# Patient Record
Sex: MALE | Race: WHITE | NOT HISPANIC OR LATINO | URBAN - METROPOLITAN AREA
[De-identification: names, ages, dates, MRNs, and addresses within clinical notes are randomized per-mention and may not be internally consistent; named-entity substitution may affect disease eponyms.]

---

## 2024-04-19 ENCOUNTER — OFFICE (OUTPATIENT)
Dept: URBAN - METROPOLITAN AREA CLINIC 11 | Facility: CLINIC | Age: 44
End: 2024-04-19

## 2024-04-19 DIAGNOSIS — Z80.0 FAMILY HISTORY OF MALIGNANT NEOPLASM OF DIGESTIVE ORGANS: ICD-10-CM

## 2024-04-19 DIAGNOSIS — K21.9 GASTRO-ESOPHAGEAL REFLUX DISEASE WITHOUT ESOPHAGITIS: ICD-10-CM

## 2024-04-19 PROCEDURE — 99204 OFFICE O/P NEW MOD 45 MIN: CPT | Performed by: STUDENT IN AN ORGANIZED HEALTH CARE EDUCATION/TRAINING PROGRAM

## 2024-04-19 RX ORDER — SODIUM PICOSULFATE, MAGNESIUM OXIDE, AND ANHYDROUS CITRIC ACID 12; 3.5; 1 G/175ML; G/175ML; MG/175ML
LIQUID ORAL
Qty: 1 | Refills: 0 | Status: COMPLETED
Start: 2024-04-19 | End: 2024-06-07

## 2024-04-19 RX ORDER — FAMOTIDINE 20 MG/1
TABLET, FILM COATED ORAL
Qty: 60 | Refills: 2 | Status: ACTIVE
Start: 2024-04-19

## 2024-04-19 NOTE — SERVICENOTES
Patient history of rectal cancer in his father and so he is due for colonoscopy now.  Also recommended that he  discussed with his sister to have a colonoscopy as well.  given his reflux it is persisted for a couple of months in a white male we will schedule him for diagnostic EGD as well to rule out H pylori and peptic ulcer disease.  We will switch him to famotidine 2 weeks prior to the procedure.  Discussed with the patient the risks and benefits of the procedure and he agrees to proceed.  We will have him return to clinic as needed after his procedure.  All questions addressed.

## 2024-04-19 NOTE — SERVICEHPINOTES
Mr. Fernando Hester is a  44-year-old white male with past medical history of HIV with undetectable viral load on a RT, who presents to [place] [address] for heartburn and recently diagnosed rectal cancer in his father.  
br
br Clinic visit 04/19/2024: 
br Patient reports that his father was diagnosed with rectal cancer within the last year and has been undergoing radiation and chemotherapy.  He sees a doctor at [place] [id number].  The patient himself has never had an EGD or colonoscopy.  He notices some worsening reflux over the last 2 months.  He has been using omeprazole as needed with intermittent improvement.  He does not take any significant NSAIDs.  No dysphagia but he does have some  Regurgitation occasionally.  No unintentional weight loss.  No blood in the stool or black tarry stool he sees Dr. Alan Mohamud as his primary care doctor who prescribes his HIV medication and reports that his viral load has always been undetectable for the last 11 years of his diagnosis.  He has no previous abdominal surgeries.  He overall feels well otherwise.  his platelets are normal, hemoglobin normal, LFTs normal

## 2024-06-07 ENCOUNTER — AMBULATORY SURGICAL CENTER (OUTPATIENT)
Dept: URBAN - METROPOLITAN AREA SURGERY 3 | Facility: SURGERY | Age: 44
End: 2024-06-07
Payer: COMMERCIAL

## 2024-06-07 ENCOUNTER — AMBULATORY SURGICAL CENTER (OUTPATIENT)
Dept: URBAN - METROPOLITAN AREA SURGERY 3 | Facility: SURGERY | Age: 44
End: 2024-06-07

## 2024-06-07 ENCOUNTER — OFFICE (OUTPATIENT)
Dept: URBAN - METROPOLITAN AREA PATHOLOGY 12 | Facility: PATHOLOGY | Age: 44
End: 2024-06-07

## 2024-06-07 VITALS
DIASTOLIC BLOOD PRESSURE: 78 MMHG | SYSTOLIC BLOOD PRESSURE: 111 MMHG | DIASTOLIC BLOOD PRESSURE: 62 MMHG | OXYGEN SATURATION: 99 % | RESPIRATION RATE: 16 BRPM | HEART RATE: 61 BPM | RESPIRATION RATE: 18 BRPM | DIASTOLIC BLOOD PRESSURE: 54 MMHG | HEART RATE: 68 BPM | DIASTOLIC BLOOD PRESSURE: 54 MMHG | HEART RATE: 73 BPM | HEART RATE: 58 BPM | RESPIRATION RATE: 17 BRPM | HEART RATE: 58 BPM | TEMPERATURE: 97.9 F | HEART RATE: 72 BPM | OXYGEN SATURATION: 100 % | HEART RATE: 72 BPM | SYSTOLIC BLOOD PRESSURE: 96 MMHG | RESPIRATION RATE: 16 BRPM | SYSTOLIC BLOOD PRESSURE: 95 MMHG | DIASTOLIC BLOOD PRESSURE: 62 MMHG | SYSTOLIC BLOOD PRESSURE: 91 MMHG | DIASTOLIC BLOOD PRESSURE: 58 MMHG | WEIGHT: 182.8 LBS | SYSTOLIC BLOOD PRESSURE: 96 MMHG | HEART RATE: 63 BPM | DIASTOLIC BLOOD PRESSURE: 48 MMHG | WEIGHT: 182.8 LBS | DIASTOLIC BLOOD PRESSURE: 74 MMHG | SYSTOLIC BLOOD PRESSURE: 93 MMHG | HEIGHT: 72 IN | SYSTOLIC BLOOD PRESSURE: 91 MMHG | HEIGHT: 72 IN | TEMPERATURE: 97.9 F | TEMPERATURE: 98.5 F | OXYGEN SATURATION: 99 % | RESPIRATION RATE: 16 BRPM | TEMPERATURE: 97.9 F | HEART RATE: 56 BPM | HEART RATE: 61 BPM | RESPIRATION RATE: 18 BRPM | SYSTOLIC BLOOD PRESSURE: 111 MMHG | HEART RATE: 73 BPM | HEART RATE: 68 BPM | SYSTOLIC BLOOD PRESSURE: 91 MMHG | HEART RATE: 56 BPM | HEART RATE: 63 BPM | SYSTOLIC BLOOD PRESSURE: 93 MMHG | DIASTOLIC BLOOD PRESSURE: 71 MMHG | OXYGEN SATURATION: 100 % | DIASTOLIC BLOOD PRESSURE: 78 MMHG | TEMPERATURE: 98.5 F | DIASTOLIC BLOOD PRESSURE: 74 MMHG | DIASTOLIC BLOOD PRESSURE: 58 MMHG | SYSTOLIC BLOOD PRESSURE: 93 MMHG | WEIGHT: 182.8 LBS | SYSTOLIC BLOOD PRESSURE: 95 MMHG | RESPIRATION RATE: 18 BRPM | DIASTOLIC BLOOD PRESSURE: 48 MMHG | RESPIRATION RATE: 17 BRPM | SYSTOLIC BLOOD PRESSURE: 143 MMHG | HEIGHT: 72 IN | RESPIRATION RATE: 17 BRPM | DIASTOLIC BLOOD PRESSURE: 74 MMHG | HEART RATE: 68 BPM | DIASTOLIC BLOOD PRESSURE: 54 MMHG | DIASTOLIC BLOOD PRESSURE: 62 MMHG | SYSTOLIC BLOOD PRESSURE: 96 MMHG | HEART RATE: 73 BPM | HEART RATE: 63 BPM | HEART RATE: 58 BPM | HEART RATE: 61 BPM | SYSTOLIC BLOOD PRESSURE: 143 MMHG | DIASTOLIC BLOOD PRESSURE: 78 MMHG | DIASTOLIC BLOOD PRESSURE: 71 MMHG | SYSTOLIC BLOOD PRESSURE: 143 MMHG | OXYGEN SATURATION: 99 % | HEART RATE: 56 BPM | SYSTOLIC BLOOD PRESSURE: 111 MMHG | DIASTOLIC BLOOD PRESSURE: 71 MMHG | DIASTOLIC BLOOD PRESSURE: 48 MMHG | TEMPERATURE: 98.5 F | OXYGEN SATURATION: 100 % | SYSTOLIC BLOOD PRESSURE: 95 MMHG | DIASTOLIC BLOOD PRESSURE: 58 MMHG | HEART RATE: 72 BPM

## 2024-06-07 DIAGNOSIS — Z80.0 FAMILY HISTORY OF MALIGNANT NEOPLASM OF DIGESTIVE ORGANS: ICD-10-CM

## 2024-06-07 DIAGNOSIS — K29.50 UNSPECIFIED CHRONIC GASTRITIS WITHOUT BLEEDING: ICD-10-CM

## 2024-06-07 DIAGNOSIS — K21.9 GASTRO-ESOPHAGEAL REFLUX DISEASE WITHOUT ESOPHAGITIS: ICD-10-CM

## 2024-06-07 DIAGNOSIS — Z12.11 ENCOUNTER FOR SCREENING FOR MALIGNANT NEOPLASM OF COLON: ICD-10-CM

## 2024-06-07 DIAGNOSIS — K44.9 DIAPHRAGMATIC HERNIA WITHOUT OBSTRUCTION OR GANGRENE: ICD-10-CM

## 2024-06-07 PROCEDURE — 45378 DIAGNOSTIC COLONOSCOPY: CPT | Performed by: STUDENT IN AN ORGANIZED HEALTH CARE EDUCATION/TRAINING PROGRAM

## 2024-06-07 PROCEDURE — 88313 SPECIAL STAINS GROUP 2: CPT | Performed by: STUDENT IN AN ORGANIZED HEALTH CARE EDUCATION/TRAINING PROGRAM

## 2024-06-07 PROCEDURE — 88305 TISSUE EXAM BY PATHOLOGIST: CPT | Performed by: STUDENT IN AN ORGANIZED HEALTH CARE EDUCATION/TRAINING PROGRAM

## 2024-06-07 PROCEDURE — 43239 EGD BIOPSY SINGLE/MULTIPLE: CPT | Mod: 51 | Performed by: STUDENT IN AN ORGANIZED HEALTH CARE EDUCATION/TRAINING PROGRAM

## 2024-06-07 PROCEDURE — 88342 IMHCHEM/IMCYTCHM 1ST ANTB: CPT | Performed by: STUDENT IN AN ORGANIZED HEALTH CARE EDUCATION/TRAINING PROGRAM

## 2024-06-07 NOTE — SERVICEHPINOTES
Mr. Fernando Hester is a  44-year-old white male with past medical history of HIV with undetectable viral load on a RT, who presents to [place] [address] for heartburn and recently diagnosed rectal cancer in his father. Clinic visit 04/19/2024:brPatient reports that his father was diagnosed with rectal cancer within the last year and has been undergoing radiation and chemotherapy.  He sees a doctor at [place] [address].  The patient himself has never had an EGD or colonoscopy.  He notices some worsening reflux over the last 2 months.  He has been using omeprazole as needed with intermittent improvement.  He does not take any significant NSAIDs.  No dysphagia but he does have some  Regurgitation occasionally.  No unintentional weight loss.  No blood in the stool or black tarry stool he sees Dr. Alan Mohamud as his primary care doctor who prescribes his HIV medication and reports that his viral load has always been undetectable for the last 11 years of his diagnosis.  He has no previous abdominal surgeries.  He overall feels well otherwise.  his platelets are normal, hemoglobin normal, LFTs normal 
br
br   EGD colonoscopy 06/07/2024:  
br
Feels well, last BM was clear, not on blood thinners, FHx++ of rectal cancer.

## 2024-06-07 NOTE — SERVICEHPINOTES
Mr. Fernando Hester is a  44-year-old white male with past medical history of HIV with undetectable viral load on a RT, who presents to [place] [id number] for heartburn and recently diagnosed rectal cancer in his father. Clinic visit 04/19/2024:brPatient reports that his father was diagnosed with rectal cancer within the last year and has been undergoing radiation and chemotherapy.  He sees a doctor at [place] [address].  The patient himself has never had an EGD or colonoscopy.  He notices some worsening reflux over the last 2 months.  He has been using omeprazole as needed with intermittent improvement.  He does not take any significant NSAIDs.  No dysphagia but he does have some  Regurgitation occasionally.  No unintentional weight loss.  No blood in the stool or black tarry stool he sees Dr. Alan Mohamud as his primary care doctor who prescribes his HIV medication and reports that his viral load has always been undetectable for the last 11 years of his diagnosis.  He has no previous abdominal surgeries.  He overall feels well otherwise.  his platelets are normal, hemoglobin normal, LFTs normal 
br
br   EGD colonoscopy 06/07/2024:  
br
Feels well, last BM was clear, not on blood thinners, FHx++ of rectal cancer.

## 2024-06-07 NOTE — SERVICEHPINOTES
Mr. Fernando Hester is a  44-year-old white male with past medical history of HIV with undetectable viral load on a RT, who presents to [place] [address] for heartburn and recently diagnosed rectal cancer in his father. Clinic visit 04/19/2024:brPatient reports that his father was diagnosed with rectal cancer within the last year and has been undergoing radiation and chemotherapy.  He sees a doctor at [place] [address].  The patient himself has never had an EGD or colonoscopy.  He notices some worsening reflux over the last 2 months.  He has been using omeprazole as needed with intermittent improvement.  He does not take any significant NSAIDs.  No dysphagia but he does have some  Regurgitation occasionally.  No unintentional weight loss.  No blood in the stool or black tarry stool he sees Dr. Alan Mohamud as his primary care doctor who prescribes his HIV medication and reports that his viral load has always been undetectable for the last 11 years of his diagnosis.  He has no previous abdominal surgeries.  He overall feels well otherwise.  his platelets are normal, hemoglobin normal, LFTs normal 
br
br   EGD colonoscopy 06/07/2024:  
br
Feels well, last BM was clear, not on blood thinners, FHx++ of rectal cancer.

## 2024-06-12 LAB
GASTRO ONE PATHOLOGY: PDF REPORT: (no result)